# Patient Record
Sex: MALE | Race: WHITE | NOT HISPANIC OR LATINO | URBAN - METROPOLITAN AREA
[De-identification: names, ages, dates, MRNs, and addresses within clinical notes are randomized per-mention and may not be internally consistent; named-entity substitution may affect disease eponyms.]

---

## 2023-10-24 ENCOUNTER — EMERGENCY (EMERGENCY)
Facility: HOSPITAL | Age: 43
LOS: 1 days | Discharge: HOME | End: 2023-10-24
Attending: EMERGENCY MEDICINE | Admitting: EMERGENCY MEDICINE
Payer: COMMERCIAL

## 2023-10-24 VITALS
HEART RATE: 65 BPM | TEMPERATURE: 98 F | OXYGEN SATURATION: 100 % | SYSTOLIC BLOOD PRESSURE: 112 MMHG | RESPIRATION RATE: 18 BRPM | DIASTOLIC BLOOD PRESSURE: 74 MMHG

## 2023-10-24 PROCEDURE — 99284 EMERGENCY DEPT VISIT MOD MDM: CPT | Mod: 25

## 2023-10-24 PROCEDURE — 12013 RPR F/E/E/N/L/M 2.6-5.0 CM: CPT

## 2023-10-24 NOTE — ED PROCEDURE NOTE - DEPTH OF REPAIR FOR WOUND CLOSURE
subcutaneous tissue Opioid Counseling: I discussed with the patient the potential side effects of opioids including but not limited to addiction, altered mental status, and depression. I stressed avoiding alcohol, benzodiazepines, muscle relaxants and sleep aids unless specifically okayed by a physician. The patient verbalized understanding of the proper use and possible adverse effects of opioids. All of the patient's questions and concerns were addressed. They were instructed to flush the remaining pills down the toilet if they did not need them for pain.

## 2023-10-24 NOTE — ED ADULT NURSE NOTE - OBJECTIVE STATEMENT
A&Ox4. ambulatory. pt denies SOB, chest pain, dizziness, weakness, urinary symptoms, HA, n/v/d, fevers, chills, LOC. respirations are even and un labored. safety precautions maintained.

## 2023-10-24 NOTE — ED PROVIDER NOTE - PATIENT PORTAL LINK FT
You can access the FollowMyHealth Patient Portal offered by [facility] by registering at the following website: [email or web address]. By joining [employer]’s FollowMyHealth portal, you will also be able to view your health information using other applications (apps) compatible with our system.

## 2023-10-24 NOTE — ED PROVIDER NOTE - CLINICAL SUMMARY MEDICAL DECISION MAKING FREE TEXT BOX
Pt s/p laceration of face- simple, right face.  No bony step off- for suturing of laceration--was with plastic box hit face

## 2023-10-24 NOTE — ED PROVIDER NOTE - NSFOLLOWUPINSTRUCTIONS_ED_ALL_ED_FT
Keep wound clean and dry for 24 hours then wash gently.    You can take tylenol 325 mg every 4 hours as needed for pain and/or motrin 200 mg every 6-8 hours as needed for pain  Bacitracin to wound twice a day.  Stay out of the sun  Return in 5-7 days for suture removal    You can return for a wound check in 2 days  If worse pain, fever, redness, pus, headache or any worse symptoms return to the ER

## 2023-10-24 NOTE — ED PROVIDER NOTE - SKIN WOUND TYPE
lateral to left eye with small couple mm laceration, eomi, superficial, small 1 mm abrasion below that/LACERATION(S)

## 2023-10-24 NOTE — ED ADULT TRIAGE NOTE - CHIEF COMPLAINT QUOTE
Pt brought in by EMS from work after a plastic box fell on his head. Pt noted to have swelling above L eye. pt denies use of blood thinners

## 2023-10-24 NOTE — ED PROVIDER NOTE - NSDCPRINTRESULTS_ED_ALL_ED
TRANSFER - OUT REPORT:    Verbal report given to DAYJACOB on Davin Coleman being transferred to [facility] for routine post - op       Report consisted of patient's Situation, Background, Assessment and   Recommendations(SBAR). Information from the following report(s) Procedure Summary was reviewed with the receiving nurse. Opportunity for questions and clarification was provided.       Patient transported with:   Registered Nurse Patient requests all Lab, Cardiology, and Radiology Results on their Discharge Instructions

## 2023-10-24 NOTE — ED PROVIDER NOTE - OBJECTIVE STATEMENT
laceration to right face near right eyelid.  No eye pain.   No blurry vision laceration to right face near right eyelid.  No eye pain.   No blurry vision.  Pt states he was at work as an  for the [employer] and a plastic box fell from a shelf and hit him in the right face, no fall or loc.  No dizziness or eye pain.  + 2 lacerations